# Patient Record
Sex: MALE | Race: OTHER | HISPANIC OR LATINO | URBAN - METROPOLITAN AREA
[De-identification: names, ages, dates, MRNs, and addresses within clinical notes are randomized per-mention and may not be internally consistent; named-entity substitution may affect disease eponyms.]

---

## 2024-04-13 ENCOUNTER — EMERGENCY (EMERGENCY)
Facility: HOSPITAL | Age: 24
LOS: 1 days | Discharge: HOME | End: 2024-04-13
Admitting: EMERGENCY MEDICINE
Payer: MEDICAID

## 2024-04-13 VITALS
SYSTOLIC BLOOD PRESSURE: 114 MMHG | RESPIRATION RATE: 16 BRPM | HEART RATE: 61 BPM | OXYGEN SATURATION: 99 % | DIASTOLIC BLOOD PRESSURE: 71 MMHG | TEMPERATURE: 98 F

## 2024-04-13 VITALS
TEMPERATURE: 98 F | SYSTOLIC BLOOD PRESSURE: 138 MMHG | RESPIRATION RATE: 16 BRPM | HEART RATE: 87 BPM | DIASTOLIC BLOOD PRESSURE: 68 MMHG | OXYGEN SATURATION: 98 %

## 2024-04-13 LAB
ALBUMIN SERPL ELPH-MCNC: 3.8 G/DL — SIGNIFICANT CHANGE UP (ref 3.4–5)
ALP SERPL-CCNC: 135 U/L — HIGH (ref 40–120)
ALT FLD-CCNC: 38 U/L — SIGNIFICANT CHANGE UP (ref 12–42)
ANION GAP SERPL CALC-SCNC: 8 MMOL/L — LOW (ref 9–16)
AST SERPL-CCNC: 28 U/L — SIGNIFICANT CHANGE UP (ref 15–37)
BASOPHILS # BLD AUTO: 0.03 K/UL — SIGNIFICANT CHANGE UP (ref 0–0.2)
BASOPHILS NFR BLD AUTO: 0.4 % — SIGNIFICANT CHANGE UP (ref 0–2)
BILIRUB SERPL-MCNC: 0.3 MG/DL — SIGNIFICANT CHANGE UP (ref 0.2–1.2)
BUN SERPL-MCNC: 16 MG/DL — SIGNIFICANT CHANGE UP (ref 7–23)
CALCIUM SERPL-MCNC: 8.7 MG/DL — SIGNIFICANT CHANGE UP (ref 8.5–10.5)
CHLORIDE SERPL-SCNC: 106 MMOL/L — SIGNIFICANT CHANGE UP (ref 96–108)
CO2 SERPL-SCNC: 26 MMOL/L — SIGNIFICANT CHANGE UP (ref 22–31)
CREAT SERPL-MCNC: 0.95 MG/DL — SIGNIFICANT CHANGE UP (ref 0.5–1.3)
EGFR: 116 ML/MIN/1.73M2 — SIGNIFICANT CHANGE UP
EOSINOPHIL # BLD AUTO: 0.28 K/UL — SIGNIFICANT CHANGE UP (ref 0–0.5)
EOSINOPHIL NFR BLD AUTO: 3.9 % — SIGNIFICANT CHANGE UP (ref 0–6)
GLUCOSE SERPL-MCNC: 98 MG/DL — SIGNIFICANT CHANGE UP (ref 70–99)
HCT VFR BLD CALC: 37.7 % — LOW (ref 39–50)
HGB BLD-MCNC: 13.2 G/DL — SIGNIFICANT CHANGE UP (ref 13–17)
IMM GRANULOCYTES NFR BLD AUTO: 0.3 % — SIGNIFICANT CHANGE UP (ref 0–0.9)
LYMPHOCYTES # BLD AUTO: 2.08 K/UL — SIGNIFICANT CHANGE UP (ref 1–3.3)
LYMPHOCYTES # BLD AUTO: 28.9 % — SIGNIFICANT CHANGE UP (ref 13–44)
MAGNESIUM SERPL-MCNC: 2 MG/DL — SIGNIFICANT CHANGE UP (ref 1.6–2.6)
MCHC RBC-ENTMCNC: 29.9 PG — SIGNIFICANT CHANGE UP (ref 27–34)
MCHC RBC-ENTMCNC: 35 GM/DL — SIGNIFICANT CHANGE UP (ref 32–36)
MCV RBC AUTO: 85.5 FL — SIGNIFICANT CHANGE UP (ref 80–100)
MONOCYTES # BLD AUTO: 0.58 K/UL — SIGNIFICANT CHANGE UP (ref 0–0.9)
MONOCYTES NFR BLD AUTO: 8.1 % — SIGNIFICANT CHANGE UP (ref 2–14)
NEUTROPHILS # BLD AUTO: 4.21 K/UL — SIGNIFICANT CHANGE UP (ref 1.8–7.4)
NEUTROPHILS NFR BLD AUTO: 58.4 % — SIGNIFICANT CHANGE UP (ref 43–77)
NRBC # BLD: 0 /100 WBCS — SIGNIFICANT CHANGE UP (ref 0–0)
PLATELET # BLD AUTO: 218 K/UL — SIGNIFICANT CHANGE UP (ref 150–400)
POTASSIUM SERPL-MCNC: 4.1 MMOL/L — SIGNIFICANT CHANGE UP (ref 3.5–5.3)
POTASSIUM SERPL-SCNC: 4.1 MMOL/L — SIGNIFICANT CHANGE UP (ref 3.5–5.3)
PROT SERPL-MCNC: 7 G/DL — SIGNIFICANT CHANGE UP (ref 6.4–8.2)
RBC # BLD: 4.41 M/UL — SIGNIFICANT CHANGE UP (ref 4.2–5.8)
RBC # FLD: 12.3 % — SIGNIFICANT CHANGE UP (ref 10.3–14.5)
SODIUM SERPL-SCNC: 140 MMOL/L — SIGNIFICANT CHANGE UP (ref 132–145)
TROPONIN I, HIGH SENSITIVITY RESULT: 6.7 NG/L — SIGNIFICANT CHANGE UP
TROPONIN I, HIGH SENSITIVITY RESULT: 8.6 NG/L — SIGNIFICANT CHANGE UP
WBC # BLD: 7.2 K/UL — SIGNIFICANT CHANGE UP (ref 3.8–10.5)
WBC # FLD AUTO: 7.2 K/UL — SIGNIFICANT CHANGE UP (ref 3.8–10.5)

## 2024-04-13 PROCEDURE — 99285 EMERGENCY DEPT VISIT HI MDM: CPT

## 2024-04-13 PROCEDURE — 72132 CT LUMBAR SPINE W/DYE: CPT | Mod: 26,MC

## 2024-04-13 PROCEDURE — 71275 CT ANGIOGRAPHY CHEST: CPT | Mod: 26,MC

## 2024-04-13 RX ORDER — MORPHINE SULFATE 50 MG/1
2 CAPSULE, EXTENDED RELEASE ORAL ONCE
Refills: 0 | Status: DISCONTINUED | OUTPATIENT
Start: 2024-04-13 | End: 2024-04-13

## 2024-04-13 RX ORDER — SODIUM CHLORIDE 9 MG/ML
1000 INJECTION INTRAMUSCULAR; INTRAVENOUS; SUBCUTANEOUS ONCE
Refills: 0 | Status: COMPLETED | OUTPATIENT
Start: 2024-04-13 | End: 2024-04-13

## 2024-04-13 RX ADMIN — MORPHINE SULFATE 2 MILLIGRAM(S): 50 CAPSULE, EXTENDED RELEASE ORAL at 04:10

## 2024-04-13 RX ADMIN — SODIUM CHLORIDE 1000 MILLILITER(S): 9 INJECTION INTRAMUSCULAR; INTRAVENOUS; SUBCUTANEOUS at 04:39
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
## 2024-04-13 NOTE — ED ADULT NURSE NOTE - NSFALLUNIVINTERV_ED_ALL_ED
Bed/Stretcher in lowest position, wheels locked, appropriate side rails in place/Call bell, personal items and telephone in reach/Instruct patient to call for assistance before getting out of bed/chair/stretcher/Non-slip footwear applied when patient is off stretcher/[place] to call system/Physically safe environment - no spills, clutter or unnecessary equipment/Purposeful proactive rounding/Room/bathroom lighting operational, light cord in reach

## 2024-04-13 NOTE — ED PROVIDER NOTE - PATIENT PORTAL LINK FT
You can access the FollowMyHealth Patient Portal offered by [facility] by registering at the following website: [email or web address]. By joining [employer]’s FollowMyHealth portal, you will also be able to view your health information using other applications (apps) compatible with our system.

## 2024-04-13 NOTE — ED PROVIDER NOTE - NSFOLLOWUPINSTRUCTIONS_ED_ALL_ED_FT
TODOS LOS PRUEBITOS DE MARCELA Y IMAGENES PARECE DB. ES IMPORTANTE QUE SIGUE CON JOYCE MEDICO PRIMARIO ESTA SEMANA SI NECESITAS MAS EXAMENES QUE NO PODEMOS HACER AQUI.     BIENVENIDO NAPROXEN RICHARD LA RECETA. ESTA DB QUE BIENVENIDO CON TYLENOL CHING NO CON IBUPROFEN. BIENVENIDO CON COMIDA PARA PROTEGER JOYCE ESTOMAGO.     ¿Qué son los moretones?  Los moretones se producen cuando los vasos sanguíneos que están debajo de la piel se rompen, ching la piel no se corta. La marcela se filtra hacia los tejidos debajo de la piel. Los moretones dwight son de color elmore y luego se vuelven azules o púrpuras. A medida que se van curando, pueden nik un color phuong y amarillo (figura 1). La mayoría de los moretones se curan en el transcurso de 1 a 2 semanas, ching algunos de la o más tiempo.    Los moretones pueden aparecer cuando lamar persona se lastima, se  o se golpea. En general, las personas sienten dolor y tienen inflamación en la osito del moretón. A veces, la inflamación aparece en forma inmediata, y otras, 1 o 2 días después.    En algunas personas, los moretones aparecen con más facilidad y, además, son peores. Dendron incluye a las personas que tienen padecimientos que impiden la coagulación normal de la marcela y que enmanuel medicinas anticoagulantes.    ¿Cómo se trata un moretón?  Los moretones se curan solos, ching para sentirse mejor y ayudar a curarlos, puede hacer lo siguiente:    ?Coloque un paquete de gel frío, lamar bolsa de hielo o lamar bolsa de verduras congeladas en la osito lesionada cada hora a cada 2 horas, jose 15 minutos cada vez. Coloque lamar toalla lani entre el hielo (o el objeto frío) y joyce piel. Aplique hielo (o cualquier objeto frío) jose al menos 6 horas después de la lesión. Algunas personas prefieren aplicar hielo jose más tiempo, incluso hasta dos días después de la lesión.    ?Si es posible, eleve la osito – Elevar la osito por encima del nivel del corazón ayuda a reducir la inflamación.    ?[place] [address] medicinas contra el dolor y la inflamación – Para tratar el dolor, puede nik paracetamol (acetaminofén) (ejemplo de joyce comercial: Tylenol). Para tratar el dolor y la inflamación, puede nik ibuprofeno (ejemplos de marcas comerciales: Advil, Motrin). Sin embargo, las personas que tienen ciertos padecimientos o usan ciertas medicinas no deben usar ibuprofeno. Si no está seguro al respecto, consulte a joyce médico o enfermero.    ?Use lamar venda elástica – Usar lamar venda elástica de "compresión" para aplicar presión en la osito puede disminuir la inflamación. Tenga cuidado de no ajustar demasiado la venda. En el albino de la mayoría de las lesiones, puede usar la venda jose los primeros nettie mientras armaan, ching quítesela al dormir.    Si tiene otra lesión en la misma osito, richard un tobillo esguinzado, puede seguir usando la venda elástica mientras armaan la lesión.    No use compresas calientes ni lamar almohadilla térmica jose las primeras 48 horas después de la lesión. Poco después de lamar lesión, el calor puede aumentar la inflamación y el dolor.    Recuerde no pinchar el moretón con lamar aguja u otro objeto para drenarlo.    ¿Cuándo mirtha llamar al médico o enfermero?  Llame a joyce médico o enfermero si:    ?Tiene fiebre    ?El moretón hace que las articulaciones se inflamen    ?No puede moverse o caminar a causa del moretón    ?Le aparecen moretones sin motivo o tiene sangrados inusuales, richard por ejemplo en las encías o en la orina

## 2024-04-13 NOTE — ED PROVIDER NOTE - INTERNATIONAL TRAVEL
Just prior to arrival mother states pt swallowed a silver object-mother feels it was a dime. Pt in no distress. Skin WDL. resp are regular and unlabored, breath sounds are clear throughout. Mother states pt is healthy,born on time with no overnight hosp stays and is up to date on immunizations. Heart tones are regular.    No

## 2024-04-13 NOTE — ED ADULT NURSE NOTE - NS ED NURSE LEVEL OF CONSCIOUSNESS ORIENTATION
MEDICATIONS  (PRN):  aluminum hydroxide/magnesium hydroxide/simethicone Suspension 30 milliLiter(s) Oral every 4 hours PRN Dyspepsia  artificial tears Solution 1 Drop(s) Both EYES four times a day PRN eye irritation  glycerin Suppository - Adult 1 Suppository(s) Rectal daily PRN constipation  risperiDONE   Tablet 0.25 milliGRAM(s) Oral every 8 hours PRN Agitation  saline laxative (FLEET) Rectal Enema 1 Enema Rectal daily PRN constipation   Oriented - self; Oriented - place; Oriented - time

## 2024-04-13 NOTE — ED PROVIDER NOTE - NS ED ROS FT
+chest pain; back pain  Denies fevers, chills, nausea, vomiting, diarrhea, constipation, abdominal pain, urinary symptoms, palpitations, shortness of breath, dyspnea on exertion, syncope/near syncope, cough/URI symptoms, headache, weakness, numbness, focal deficits, visual changes, gait or balance changes, dizziness
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
## 2024-04-13 NOTE — ED PROVIDER NOTE - CLINICAL SUMMARY MEDICAL DECISION MAKING FREE TEXT BOX
24-year-old male, denies past medical history, presents to the emergency room complaining of left-sided rib and lower back pain for the past month. Patient noted to have tenderness to palpation along the left side of the chest wall as well as the midline lumbar spine.  Pulse ox in triage 90% on room air and heart rate is 87.  Blood pressure 130/68.  Will plan to obtain EKG, medical labs, CT angio of the chest, and CT of the lumbar spine.  IV morphine given for pain relief.  Will reassess and dispo pending medical workup. 24-year-old male, denies past medical history, presents to the emergency room complaining of left-sided rib and lower back pain for the past month. Patient noted to have tenderness to palpation along the left side of the chest wall as well as the midline lumbar spine.  Pulse ox in triage 98% on room air and heart rate is 87.  Blood pressure 130/68.  Will plan to obtain EKG, medical labs, CT angio of the chest, and CT of the lumbar spine.  IV morphine given for pain relief.  Will reassess and dispo pending medical workup.    workuup negative - will d/c with nsaids.

## 2024-04-13 NOTE — ED PROVIDER NOTE - PHYSICAL EXAMINATION
VITAL SIGNS: I have reviewed nursing notes and confirm.  CONSTITUTIONAL: Well-developed; well-nourished; in no acute distress.  SKIN: Skin is warm and dry, no acute rash.  HEAD: Normocephalic; atraumatic.  NECK: Supple; non tender.  CARD: S1, S2 normal; no murmurs, gallops, or rubs. Regular rate and rhythm. +ttp along the L side of the chest wall, lower ribs 9-12 midclavicular line.  RESP: No wheezes, rales or rhonchi.  ABD: Soft; non-distended; non-tender; no rebound or guarding.  SPINE: +midline lumbar ttp w/ FROM intact.  EXT: Normal ROM. No clubbing, cyanosis or edema.  NEURO: Alert, oriented. Grossly unremarkable. JOHNSON, normal tone, no gross motor or sensory changes. Fluent speech.  PSYCH: Cooperative, appropriate. Mood and affect wnl.

## 2024-04-15 DIAGNOSIS — R06.02 SHORTNESS OF BREATH: ICD-10-CM
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
## 2024-04-15 DIAGNOSIS — M54.50 LOW BACK PAIN, UNSPECIFIED: ICD-10-CM

## 2024-04-15 DIAGNOSIS — R00.1 BRADYCARDIA, UNSPECIFIED: ICD-10-CM
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
## 2024-04-15 DIAGNOSIS — R07.81 PLEURODYNIA: ICD-10-CM

## 2024-06-19 ENCOUNTER — EMERGENCY (EMERGENCY)
Facility: HOSPITAL | Age: 24
LOS: 1 days | Discharge: HOME | End: 2024-06-19
Admitting: EMERGENCY MEDICINE
Payer: MEDICAID

## 2024-06-19 VITALS
DIASTOLIC BLOOD PRESSURE: 66 MMHG | RESPIRATION RATE: 16 BRPM | HEIGHT: 62.99 IN | WEIGHT: 145.06 LBS | TEMPERATURE: 98 F | HEART RATE: 64 BPM | OXYGEN SATURATION: 100 % | SYSTOLIC BLOOD PRESSURE: 132 MMHG

## 2024-06-19 DIAGNOSIS — R04.0 EPISTAXIS: ICD-10-CM

## 2024-06-19 LAB
ALBUMIN SERPL ELPH-MCNC: 3.7 G/DL — SIGNIFICANT CHANGE UP (ref 3.4–5)
ALP SERPL-CCNC: 105 U/L — SIGNIFICANT CHANGE UP (ref 40–120)
ALT FLD-CCNC: 35 U/L — SIGNIFICANT CHANGE UP (ref 12–42)
ANION GAP SERPL CALC-SCNC: 9 MMOL/L — SIGNIFICANT CHANGE UP (ref 9–16)
AST SERPL-CCNC: 31 U/L — SIGNIFICANT CHANGE UP (ref 15–37)
BASOPHILS # BLD AUTO: 0.03 K/UL — SIGNIFICANT CHANGE UP (ref 0–0.2)
BASOPHILS NFR BLD AUTO: 0.6 % — SIGNIFICANT CHANGE UP (ref 0–2)
BILIRUB SERPL-MCNC: 0.3 MG/DL — SIGNIFICANT CHANGE UP (ref 0.2–1.2)
BUN SERPL-MCNC: 18 MG/DL — SIGNIFICANT CHANGE UP (ref 7–23)
CALCIUM SERPL-MCNC: 8.8 MG/DL — SIGNIFICANT CHANGE UP (ref 8.5–10.5)
CHLORIDE SERPL-SCNC: 104 MMOL/L — SIGNIFICANT CHANGE UP (ref 96–108)
CO2 SERPL-SCNC: 27 MMOL/L — SIGNIFICANT CHANGE UP (ref 22–31)
CREAT SERPL-MCNC: 1 MG/DL — SIGNIFICANT CHANGE UP (ref 0.5–1.3)
EGFR: 109 ML/MIN/1.73M2 — SIGNIFICANT CHANGE UP
EOSINOPHIL # BLD AUTO: 0.25 K/UL — SIGNIFICANT CHANGE UP (ref 0–0.5)
EOSINOPHIL NFR BLD AUTO: 4.8 % — SIGNIFICANT CHANGE UP (ref 0–6)
GLUCOSE SERPL-MCNC: 89 MG/DL — SIGNIFICANT CHANGE UP (ref 70–99)
HCT VFR BLD CALC: 32.1 % — LOW (ref 39–50)
HGB BLD-MCNC: 11.1 G/DL — LOW (ref 13–17)
IMM GRANULOCYTES NFR BLD AUTO: 0.2 % — SIGNIFICANT CHANGE UP (ref 0–0.9)
LYMPHOCYTES # BLD AUTO: 1.54 K/UL — SIGNIFICANT CHANGE UP (ref 1–3.3)
LYMPHOCYTES # BLD AUTO: 29.5 % — SIGNIFICANT CHANGE UP (ref 13–44)
MCHC RBC-ENTMCNC: 29.7 PG — SIGNIFICANT CHANGE UP (ref 27–34)
MCHC RBC-ENTMCNC: 34.6 GM/DL — SIGNIFICANT CHANGE UP (ref 32–36)
MCV RBC AUTO: 85.8 FL — SIGNIFICANT CHANGE UP (ref 80–100)
MONOCYTES # BLD AUTO: 0.57 K/UL — SIGNIFICANT CHANGE UP (ref 0–0.9)
MONOCYTES NFR BLD AUTO: 10.9 % — SIGNIFICANT CHANGE UP (ref 2–14)
NEUTROPHILS # BLD AUTO: 2.82 K/UL — SIGNIFICANT CHANGE UP (ref 1.8–7.4)
NEUTROPHILS NFR BLD AUTO: 54 % — SIGNIFICANT CHANGE UP (ref 43–77)
NRBC # BLD: 0 /100 WBCS — SIGNIFICANT CHANGE UP (ref 0–0)
PLATELET # BLD AUTO: 223 K/UL — SIGNIFICANT CHANGE UP (ref 150–400)
POTASSIUM SERPL-MCNC: 4.1 MMOL/L — SIGNIFICANT CHANGE UP (ref 3.5–5.3)
POTASSIUM SERPL-SCNC: 4.1 MMOL/L — SIGNIFICANT CHANGE UP (ref 3.5–5.3)
PROT SERPL-MCNC: 7.2 G/DL — SIGNIFICANT CHANGE UP (ref 6.4–8.2)
RBC # BLD: 3.74 M/UL — LOW (ref 4.2–5.8)
RBC # FLD: 12.3 % — SIGNIFICANT CHANGE UP (ref 10.3–14.5)
SODIUM SERPL-SCNC: 140 MMOL/L — SIGNIFICANT CHANGE UP (ref 132–145)
WBC # BLD: 5.22 K/UL — SIGNIFICANT CHANGE UP (ref 3.8–10.5)
WBC # FLD AUTO: 5.22 K/UL — SIGNIFICANT CHANGE UP (ref 3.8–10.5)

## 2024-06-19 PROCEDURE — 99284 EMERGENCY DEPT VISIT MOD MDM: CPT

## 2024-06-19 NOTE — ED ADULT NURSE NOTE - NSFALLUNIVINTERV_ED_ALL_ED
Bed/Stretcher in lowest position, wheels locked, appropriate side rails in place/Call bell, personal items and telephone in reach/Instruct patient to call for assistance before getting out of bed/chair/stretcher/Non-slip footwear applied when patient is off stretcher/[place] to call system/Physically safe environment - no spills, clutter or unnecessary equipment/Purposeful proactive rounding/Room/bathroom lighting operational, light cord in reach

## 2024-06-19 NOTE — ED PROVIDER NOTE - CLINICAL SUMMARY MEDICAL DECISION MAKING FREE TEXT BOX
Patient here for intermittent nosebleeding for the last 22 days.  Patient presented with distress.  Exam unremarkable  Will draw labs and have patient follow-up with ENT

## 2024-06-19 NOTE — ED PROVIDER NOTE - PATIENT PORTAL LINK FT
You can access the FollowMyHealth Patient Portal offered by [facility] by registering at the following website: [email or web address]. By joining [employer]’s FollowMyHealth portal, you will also be able to view your health information using other applications (apps) compatible with our system.

## 2024-06-19 NOTE — ED PROVIDER NOTE - CARE PROVIDER_API CALL
Stan Mckeon  Otolaryngology  [address]  [place] [address]  Phone: [phone]  Fax: [phone]  Follow Up Time: Urgent

## 2024-06-19 NOTE — ED ADULT NURSE NOTE - NSHOSCREENINGQ1_ED_ALL_ED
February 2, 2023      Urgent [facility] [address] [address]  Phone: [phone]  Fax: [phone]       Patient: Melanie Ba   YOB: 2003  Date of Visit: 02/02/2023    To Whom It May Concern:    Sandrine Ba  was at [facility] on 02/02/2023. The patient may return to work/school on Friday 2/3/2023 with no restrictions. If you have any questions or concerns, or if I can be of further assistance, please do not hesitate to contact me.    Sincerely,          Bruce Sloan PA-C      No

## 2024-06-19 NOTE — ED PROVIDER NOTE - OBJECTIVE STATEMENT
24-year-old male coming in with intermittent nosebleeds and left nare last 22 days.  Denies past medical history.  Patient appears well no acute distress.  Denies nausea, vomiting, fevers, chills.

## 2024-06-19 NOTE — ED ADULT NURSE NOTE - OBJECTIVE STATEMENT
25 y/o M here with nose bleed for 20 days, pt states he will bleed for approx 1 hour per day and then it will resolve. Pt reports feeling tired. No AC use. Pt with packing to left nare that was placed by him.

## 2024-06-19 NOTE — ED ADULT TRIAGE NOTE - CHIEF COMPLAINT QUOTE
Pt with nose bleed for 20 days, pt states he will bleed for approx 1 hour per day and then it will resolve. Pt reports feeling tired. No AC use. Pt with packing to left nare that was placed by him.

## 2024-11-13 ENCOUNTER — EMERGENCY (EMERGENCY)
Age: 24
LOS: 1 days | Discharge: HOME | End: 2024-11-13
Attending: EMERGENCY MEDICINE | Admitting: EMERGENCY MEDICINE
Payer: MEDICAID

## 2024-11-13 VITALS
HEIGHT: 62.99 IN | SYSTOLIC BLOOD PRESSURE: 146 MMHG | TEMPERATURE: 98 F | DIASTOLIC BLOOD PRESSURE: 72 MMHG | RESPIRATION RATE: 15 BRPM | WEIGHT: 145.06 LBS | HEART RATE: 67 BPM | OXYGEN SATURATION: 96 %

## 2024-11-13 PROCEDURE — 99284 EMERGENCY DEPT VISIT MOD MDM: CPT

## 2024-11-13 RX ORDER — IBUPROFEN 200 MG
1 TABLET ORAL
Qty: 60 | Refills: 3
Start: 2024-11-13

## 2024-11-13 RX ORDER — PSEUDOEPHEDRINE HCL 30 MG
1 TABLET ORAL
Qty: 30 | Refills: 3
Start: 2024-11-13

## 2024-11-13 RX ORDER — PSEUDOEPHEDRINE HCL 30 MG
30 TABLET ORAL ONCE
Refills: 0 | Status: COMPLETED | OUTPATIENT
Start: 2024-11-13 | End: 2024-11-13

## 2024-11-14 PROBLEM — Z78.9 OTHER SPECIFIED HEALTH STATUS: Chronic | Status: ACTIVE | Noted: 2024-06-19

## 2024-11-15 DIAGNOSIS — R09.81 NASAL CONGESTION: ICD-10-CM

## 2024-11-15 DIAGNOSIS — M54.50 LOW BACK PAIN, UNSPECIFIED: ICD-10-CM

## 2024-11-15 DIAGNOSIS — R53.83 OTHER FATIGUE: ICD-10-CM

## 2024-11-15 DIAGNOSIS — R11.0 NAUSEA: ICD-10-CM
